# Patient Record
Sex: FEMALE
[De-identification: names, ages, dates, MRNs, and addresses within clinical notes are randomized per-mention and may not be internally consistent; named-entity substitution may affect disease eponyms.]

---

## 2018-02-01 PROBLEM — Z86.0101 HISTORY OF ADENOMATOUS POLYP OF COLON: Status: ACTIVE | Noted: 2018-02-01

## 2018-02-01 PROBLEM — Z86.010 HISTORY OF ADENOMATOUS POLYP OF COLON: Status: ACTIVE | Noted: 2018-02-01

## 2018-02-07 NOTE — OPERATIVE REPORT
COLONOSCOPY AND ESOPHAGOGASTRODUODENOSCOPY REPORT    Patient Name:  Facundo Lopez Record #: [id number]  YOB: 1940  Date of Procedure: 2/7/2018    Referring physician: Mahi Calle MD    Surgeon:  Shalini Vogt.  Kalina Sanchez MD    Pre-op d There was no evidence of gastritis, ulcers or erosions. There were no varices or vascular anomalies. A nodule along the proximal lesser curvature with an overlying erosion was noted and biopsied.    The duodenum was normal with no erosions and with a nor

## 2018-02-07 NOTE — H&P
PRE-PROCEDURE UPDATE    HPI: Yovana Arce is a 76year old female. 10/31/1940. Patient presents for a colonoscopy and gastroscopy. ALLERGIES:   Pcn [Penicillamine]     Tongue Swelling      No current outpatient prescriptions on file.   Past Medical

## 2018-11-30 ENCOUNTER — IMAGING SERVICES (OUTPATIENT)
Dept: OTHER | Age: 78
End: 2018-11-30

## 2018-11-30 ENCOUNTER — HOSPITAL (OUTPATIENT)
Dept: OTHER | Age: 78
End: 2018-11-30
Attending: EMERGENCY MEDICINE

## 2019-09-17 PROBLEM — M67.911 TENDINOPATHY OF RIGHT ROTATOR CUFF: Status: ACTIVE | Noted: 2019-09-17

## 2019-09-17 PROBLEM — M25.511 CHRONIC PAIN OF BOTH SHOULDERS: Status: ACTIVE | Noted: 2019-09-17

## 2019-09-17 PROBLEM — G89.29 CHRONIC PAIN OF BOTH SHOULDERS: Status: ACTIVE | Noted: 2019-09-17

## 2019-09-17 PROBLEM — M19.019 AC JOINT ARTHROPATHY: Status: ACTIVE | Noted: 2019-09-17

## 2019-09-17 PROBLEM — M25.512 CHRONIC PAIN OF BOTH SHOULDERS: Status: ACTIVE | Noted: 2019-09-17

## 2019-09-17 PROBLEM — M67.912 TENDINOPATHY OF LEFT ROTATOR CUFF: Status: ACTIVE | Noted: 2019-09-17

## 2019-09-17 PROBLEM — M75.40 SHOULDER IMPINGEMENT SYNDROME, UNSPECIFIED LATERALITY: Status: ACTIVE | Noted: 2019-09-17

## 2019-09-17 NOTE — PATIENT INSTRUCTIONS
1) Get XR of the LEFT shoulder today on your way out  2) Begin formal physical therapy at our [place] facility  3) Start Celebrex 200 mg in the morning with food and 100 mg at night with food for two weeks.  The take as needed but no more than 200 mg per [employer]

## 2019-09-17 NOTE — TELEPHONE ENCOUNTER
Called [facility] for authorization of approval of. BILATERAL subacromial CSI cpt code 17201,57593,. Per Felecia[employer]UST 25 is NOT Required. Reference#[id number]. Will inform Nursing.

## 2019-09-17 NOTE — H&P
[address] H&P    Requesting Physician: Alis Soriano MD    Chief Complaint (Reason for Visit):  Patient presents with:  Shoulder Pain: Pt states that she has kristin shoulder pain and denies any injury file    Tobacco Use      Smoking status: Never Smoker      Smokeless tobacco: Never Used    Substance and Sexual Activity      Alcohol use: No        Alcohol/week: 0.0 standard drinks      Drug use: No      Sexual activity: Not on file      CURRENT MEDICAT 17   Ht 57\"   Wt 124 lb   BMI 26.83 kg/m²     Body mass index is 26.83 kg/m². General: No immediate distress  Head: Normocephalic/ Atraumatic  Eyes: Extra-occular movements intact.    Ears: No auricular hematoma or deformities  Mouth: No lesions or ul 01/18/2019 negative  Negative Final   • Ketones, UA 01/18/2019 negative  Negative mg/dL Final   • Spec Gravity 01/18/2019 1.005  1.005 - 1.030 Final   • Blood Urine 01/18/2019 negative  Negative Final   • PH Urine 01/18/2019 8.0  4.5 - 8.0 Final   • Protei - 200 mg/dL Final   • Triglycerides 01/18/2019 213* 1 - 149 mg/dL Final   • Non HDL Chol 01/18/2019 166* <130 mg/dL Final   • LDL Cholesterol 01/18/2019 123* 0 - 99 mg/dL Final   • Vitamin D, 25OH, Total 01/18/2019 50.8  30.0 - 100.0 ng/mL Final   • WBC 01 the a.c. joint. Intact glenohumeral joint.               Dictated by (CST): Carter Youngblood MD on 8/28/2018 at 16:49       Approved by (CST): Carter Youngblood MD on 8/28/2018 at 16:50              ASSESSMENT AND PLAN:  Patient is a pleasant 22-year-old female

## 2019-09-17 NOTE — TELEPHONE ENCOUNTER
Called patient to schedule follow up. Patient refused appt.  Pt said she is  ok with meds she will call if she needs anything

## 2019-10-03 NOTE — PROGRESS NOTES
UPPER EXTREMITY EVALUATION:   Referring Physician: Dr. Melissa Abarca  Diagnosis: Shoulder impingement syndrome, unspecified laterality (M75.40)  Chronic pain of both shoulders (M25.511,G89.29,M25.512)  AC joint arthropathy (M19.019)  Tendinopathy of left rotato lumbar region.  She also has no past medical history of Anesthesia complication, Arrhythmia, Diabetes ([address] Difficult intubation, Heart attack ([address] High blood pressure, High cholesterol, Malignant hyperthermia, PONV (postoperative nausea and vomiting), P rotation on L  SC: WNL  AC: WNL  GH: decreased all directions    Special tests:   Neers: (-)  Cross arm: (-)  Sheldon bhatti: (+) on R  Speeds: (+)  B    Palpation: mod restrict at post RTC on L, min restrict on R with min tenderness on palpation  Sensati Patient will be seen for 1-2 x/week or a total of 6-8 visits over a 90 day period. Anticipate gap in care in 2 weeks as pt to travel home to [facility]. Treatment will include: Manual Therapy; Therapeutic Exercises; Neuromuscular Re-education;  Therapeutic Act

## 2019-10-07 NOTE — PROGRESS NOTES
Diagnosis: Shoulder impingement syndrome, unspecified laterality (M75.40)  Chronic pain of both shoulders (M25.511,G89.29,M25.512)  AC joint arthropathy (M19.019)  Tendinopathy of left rotator cuff (S36.690)  Tendinopathy of right rotator cuff (M67.911)  compliance with HEP at least 75% of the time to allow for independent management of symptoms at discharge. - in progress  2. Pt will demo normal scapulohumeral mechanics with shoulder elevation with elimination of cervical compensation - in progress  3.  Pt

## 2019-10-09 NOTE — PROGRESS NOTES
Diagnosis: Shoulder impingement syndrome, unspecified laterality (M75.40)  Chronic pain of both shoulders (M25.511,G89.29,M25.512)  AC joint arthropathy (M19.019)  Tendinopathy of left rotator cuff (D72.973)  Tendinopathy of right rotator cuff (M67.911)  wall slides flexion - handout declined    Plan: Re-assess. Issue FOTO. Pt to leave for [facility] following next visit so may discharge at this time. Goals     • Therapy Goals      Goals:    1.  Pt will verbalize and demo compliance with HEP at least 75% of

## 2019-10-14 NOTE — PROGRESS NOTES
[place]  Pt has attended 4 visits in Physical Therapy.      Diagnosis: Shoulder impingement syndrome, unspecified laterality (M75.40)  Chronic pain of both shoulders (M25.511,G89.29,M25.512)  AC joint arthropathy (M19.019)  Tendinopathy of left r compliance with HEP however requires continued, moderate verbal and tactile cueing for proper positioning for all exercises, so questionable whether pt performing exercise and work tasks with proper mechanics.   Overall improved mobility and strength with e 4/5 for all deficit motions for increased ease with lifting and reaching overhead for work  - improved  4.  Pt will demo improved postural awareness with improved scapulohumeral mechanics to be able to lift/carry with increased ease and minimize risk for re

## 2019-11-08 PROBLEM — H11.009 PTERYGIUM OF EYE: Status: ACTIVE | Noted: 2017-06-29

## 2019-11-08 PROBLEM — H25.12 AGE-RELATED NUCLEAR CATARACT OF LEFT EYE: Status: ACTIVE | Noted: 2017-06-29

## 2019-11-26 NOTE — TELEPHONE ENCOUNTER
Medication request: celecobix 100mg oral cap  Take 2 capsules by mouth in the morning and then at night for 2 weeks and then as needed     LOV 09/17/19  NOV none

## 2019-11-27 RX ORDER — CELECOXIB 100 MG/1
CAPSULE ORAL
Qty: 90 CAPSULE | Refills: 0 | OUTPATIENT
Start: 2019-11-27

## 2019-11-27 NOTE — TELEPHONE ENCOUNTER
Need to see patient prior to refill. Have not seen this patient since September    Jabier Mccall MD, [address] [facility]  Physical Medicine and Rehabilitation/Sports Medicine  [facility]

## 2021-09-04 ENCOUNTER — LAB REQUISITION (OUTPATIENT)
Dept: SURGERY | Age: 81
End: 2021-09-04
Payer: MEDICARE

## 2021-09-04 DIAGNOSIS — Z01.818 PREOP EXAMINATION: ICD-10-CM

## 2021-09-05 LAB — SARS-COV-2 RNA RESP QL NAA+PROBE: NOT DETECTED

## 2021-09-25 ENCOUNTER — LAB REQUISITION (OUTPATIENT)
Dept: SURGERY | Age: 81
End: 2021-09-25
Payer: MEDICARE

## 2021-09-25 DIAGNOSIS — Z01.818 PREOP EXAMINATION: ICD-10-CM

## 2021-09-26 LAB — SARS-COV-2 RNA RESP QL NAA+PROBE: NOT DETECTED

## 2022-03-07 PROBLEM — R10.2 PELVIC PAIN: Status: ACTIVE | Noted: 2022-03-07

## 2022-03-07 PROBLEM — Z00.00 MEDICARE ANNUAL WELLNESS VISIT, SUBSEQUENT: Status: ACTIVE | Noted: 2022-03-07

## 2022-03-07 PROBLEM — R73.01 IFG (IMPAIRED FASTING GLUCOSE): Status: ACTIVE | Noted: 2022-03-07

## 2022-03-07 PROBLEM — Z83.3 FAMILY HISTORY OF DIABETES MELLITUS (DM): Status: ACTIVE | Noted: 2022-03-07

## 2022-03-07 PROBLEM — F51.01 PRIMARY INSOMNIA: Status: ACTIVE | Noted: 2022-03-07

## 2022-09-27 PROBLEM — H93.13 TINNITUS OF BOTH EARS: Status: ACTIVE | Noted: 2022-06-04

## 2022-09-27 PROBLEM — H91.91 HEARING LOSS OF RIGHT EAR: Status: ACTIVE | Noted: 2022-06-04

## 2022-09-27 PROBLEM — R60.9 DEPENDENT EDEMA: Status: ACTIVE | Noted: 2022-04-12

## 2023-01-07 ENCOUNTER — HOSPITAL ENCOUNTER (OUTPATIENT)
Dept: BONE DENSITY | Age: 83
Discharge: HOME | End: 2023-01-07
Attending: INTERNAL MEDICINE
Payer: MEDICARE

## 2023-01-07 DIAGNOSIS — M85.88 OSTEOPENIA OF LUMBAR SPINE: ICD-10-CM

## 2023-01-07 PROCEDURE — 77080 DXA BONE DENSITY AXIAL: CPT | Performed by: INTERNAL MEDICINE

## 2024-01-19 ENCOUNTER — HOSPITAL ENCOUNTER (OUTPATIENT)
Dept: CT IMAGING | Age: 84
Discharge: HOME | End: 2024-01-19
Payer: MEDICARE

## 2024-01-19 DIAGNOSIS — R10.84 GENERALIZED ABDOMINAL PAIN: ICD-10-CM

## 2024-01-19 LAB
CREAT BLD-MCNC: 0.8 MG/DL
EGFRCR SERPLBLD CKD-EPI 2021: 72 ML/MIN/1.73M2 (ref 60–?)

## 2024-01-19 PROCEDURE — 74177 CT ABD & PELVIS W/CONTRAST: CPT

## 2024-01-19 PROCEDURE — 82565 ASSAY OF CREATININE: CPT

## 2024-06-05 ENCOUNTER — HOSPITAL ENCOUNTER (OUTPATIENT)
Dept: ULTRASOUND IMAGING | Age: 84
Discharge: HOME | End: 2024-06-05
Attending: INTERNAL MEDICINE
Payer: MEDICARE

## 2024-06-05 DIAGNOSIS — R10.11 RUQ ABDOMINAL PAIN: ICD-10-CM

## 2024-06-05 PROCEDURE — 76705 ECHO EXAM OF ABDOMEN: CPT | Performed by: INTERNAL MEDICINE

## 2025-01-21 PROCEDURE — 80061 LIPID PANEL: CPT | Performed by: INTERNAL MEDICINE

## 2025-01-21 PROCEDURE — 84443 ASSAY THYROID STIM HORMONE: CPT | Performed by: INTERNAL MEDICINE

## 2025-01-21 PROCEDURE — 82306 VITAMIN D 25 HYDROXY: CPT | Performed by: INTERNAL MEDICINE

## 2025-01-21 PROCEDURE — 85025 COMPLETE CBC W/AUTO DIFF WBC: CPT | Performed by: INTERNAL MEDICINE

## 2025-01-21 PROCEDURE — 82550 ASSAY OF CK (CPK): CPT | Performed by: INTERNAL MEDICINE

## 2025-01-21 PROCEDURE — 80053 COMPREHEN METABOLIC PANEL: CPT | Performed by: INTERNAL MEDICINE

## 2025-01-21 PROCEDURE — 83036 HEMOGLOBIN GLYCOSYLATED A1C: CPT | Performed by: INTERNAL MEDICINE

## 2025-02-21 ENCOUNTER — HOSPITAL ENCOUNTER (OUTPATIENT)
Dept: BONE DENSITY | Age: 85
Discharge: HOME | End: 2025-02-21
Attending: INTERNAL MEDICINE
Payer: MEDICARE

## 2025-02-21 DIAGNOSIS — M81.0 POSTMENOPAUSAL OSTEOPOROSIS: ICD-10-CM

## 2025-02-21 PROCEDURE — 77080 DXA BONE DENSITY AXIAL: CPT | Performed by: INTERNAL MEDICINE

## 2025-08-08 PROCEDURE — 85025 COMPLETE CBC W/AUTO DIFF WBC: CPT | Performed by: INTERNAL MEDICINE

## 2025-08-08 PROCEDURE — 83036 HEMOGLOBIN GLYCOSYLATED A1C: CPT | Performed by: INTERNAL MEDICINE

## 2025-08-08 PROCEDURE — 80053 COMPREHEN METABOLIC PANEL: CPT | Performed by: INTERNAL MEDICINE
